# Patient Record
Sex: FEMALE | URBAN - METROPOLITAN AREA
[De-identification: names, ages, dates, MRNs, and addresses within clinical notes are randomized per-mention and may not be internally consistent; named-entity substitution may affect disease eponyms.]

---

## 2021-11-08 ENCOUNTER — TRANSCRIBE ORDER (OUTPATIENT)
Dept: SCHEDULING | Age: 26
End: 2021-11-08

## 2021-11-08 DIAGNOSIS — M25.561 RIGHT KNEE PAIN: Primary | ICD-10-CM

## 2023-03-23 ENCOUNTER — OFFICE VISIT (OUTPATIENT)
Facility: CLINIC | Age: 28
End: 2023-03-23

## 2023-03-23 VITALS
HEART RATE: 90 BPM | RESPIRATION RATE: 16 BRPM | BODY MASS INDEX: 27.51 KG/M2 | SYSTOLIC BLOOD PRESSURE: 98 MMHG | OXYGEN SATURATION: 98 % | TEMPERATURE: 98.5 F | HEIGHT: 60 IN | WEIGHT: 140.13 LBS | DIASTOLIC BLOOD PRESSURE: 70 MMHG

## 2023-03-23 DIAGNOSIS — Z09 HOSPITAL DISCHARGE FOLLOW-UP: Primary | ICD-10-CM

## 2023-03-23 DIAGNOSIS — K52.9 ENTEROCOLITIS: ICD-10-CM

## 2023-03-23 DIAGNOSIS — B99.9 RECURRENT INFECTIONS: ICD-10-CM

## 2023-03-23 SDOH — ECONOMIC STABILITY: FOOD INSECURITY: WITHIN THE PAST 12 MONTHS, YOU WORRIED THAT YOUR FOOD WOULD RUN OUT BEFORE YOU GOT MONEY TO BUY MORE.: NEVER TRUE

## 2023-03-23 SDOH — ECONOMIC STABILITY: HOUSING INSECURITY
IN THE LAST 12 MONTHS, WAS THERE A TIME WHEN YOU DID NOT HAVE A STEADY PLACE TO SLEEP OR SLEPT IN A SHELTER (INCLUDING NOW)?: NO

## 2023-03-23 SDOH — ECONOMIC STABILITY: INCOME INSECURITY: HOW HARD IS IT FOR YOU TO PAY FOR THE VERY BASICS LIKE FOOD, HOUSING, MEDICAL CARE, AND HEATING?: NOT HARD AT ALL

## 2023-03-23 SDOH — ECONOMIC STABILITY: FOOD INSECURITY: WITHIN THE PAST 12 MONTHS, THE FOOD YOU BOUGHT JUST DIDN'T LAST AND YOU DIDN'T HAVE MONEY TO GET MORE.: NEVER TRUE

## 2023-03-23 ASSESSMENT — PATIENT HEALTH QUESTIONNAIRE - PHQ9
SUM OF ALL RESPONSES TO PHQ QUESTIONS 1-9: 0
1. LITTLE INTEREST OR PLEASURE IN DOING THINGS: 0
2. FEELING DOWN, DEPRESSED OR HOPELESS: 0
SUM OF ALL RESPONSES TO PHQ QUESTIONS 1-9: 0
SUM OF ALL RESPONSES TO PHQ9 QUESTIONS 1 & 2: 0

## 2023-03-23 NOTE — PROGRESS NOTES
Violetta Burks (: 1995) is a 33 y.o. female, new patient, here for evaluation of the following chief complaint(s):  Establish Care (Follow up hospital stay)       Assessment/Plan:  1. Hospital discharge [id number] of discharge summary, imaging and lab work reviewed. No acute findings upon discharge requiring repeat lab work. 2. Enterocolitis-continuing to recover. Bowel function returning to normal.  Continue to advance diet as tolerated. 3. Recurrent infections-patient with significant exposures to numerous pathogens at work. This is a first-time infection has resulted in hospitalization and this was mostly secondary to dehydration. As she has not had recurrence hospitalizations for other minor illnesses like flu, COVID, etc., I have low suspicion for a complement or other immunologic deficiency. No recurrent pneumonia. Can continue to monitor. Recommend vitamin C, zinc, frequent handwashing and masking during flu and cold season. Return in about 1 year (around 3/23/2024) for routine check up. 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 Recently , now on . Hospital follow up-hospitalized 3/18 through 3/21 for treatment of sepsis secondary to enterocolitis with significant dehydration. Diagnosed with norovirus. Overall doing 50-70% better. Has had a bowel movement since discharge. Recurrent infections-patient concerned about recurrent infections, mostly secondary to exposure to pediatrics through work. Her last few years with significant liver, COVID, mono infections. Concerned about potential need for immunology work-up. HM-reportedly had Pap smear last year which was normal.       Vitals  Blood pressure 98/70, pulse 90, temperature 98.5 °F (36.9 °C), temperature source Tympanic, resp. rate 16, height 5' (1.524 m), weight 140 lb 2 oz (63.6 kg), last menstrual period 2023, SpO2 98 %.  Body

## 2023-03-23 NOTE — PROGRESS NOTES
Chief Complaint   Patient presents with    Establish Care     Follow up hospital stay     1. \"Have you been to the ER, urgent care clinic since your last visit? Hospitalized since your last visit? \" Yes Reason for visit: Akbar Nikhil Virus    2. \"Have you seen or consulted any other health care providers outside of the [address] [place] [address] since your last visit? \" No     3. For patients aged 39-70: Has the patient had a colonoscopy / FIT/ Cologuard? NA - based on age      If the patient is female:    4. For patients aged 41-77: Has the patient had a mammogram within the past 2 years? NA - based on age or sex      11. For patients aged 21-65: Has the patient had a pap smear? Yes - Care Gap present.  Rooming MA/LPN to request most recent results

## 2023-10-12 ENCOUNTER — TELEPHONE (OUTPATIENT)
Facility: CLINIC | Age: 28
End: 2023-10-12

## 2023-10-12 NOTE — TELEPHONE ENCOUNTER
----- Message from [employer] sent at 10/6/2023  4:31 PM EDT -----  Subject: Message to Provider    QUESTIONS  Information for Provider? pt needs a TB test before October 23, she needs   to have it for a new job.   ---------------------------------------------------------------------------  --------------  [address]  [phone]; OK to leave message on voicemail  ---------------------------------------------------------------------------  --------------  SCRIPT ANSWERS  Relationship to Patient?  Self

## 2023-10-12 NOTE — TELEPHONE ENCOUNTER
Pt needing an appointment to come in to get a ppd done before the 23rd of oct. Left message to call of as soon as possible so we can get her scheduled to get in to get that done.